# Patient Record
Sex: MALE | Race: WHITE | URBAN - METROPOLITAN AREA
[De-identification: names, ages, dates, MRNs, and addresses within clinical notes are randomized per-mention and may not be internally consistent; named-entity substitution may affect disease eponyms.]

---

## 2018-07-17 ENCOUNTER — OFFICE VISIT (OUTPATIENT)
Dept: DERMATOLOGY | Age: 53
End: 2018-07-17

## 2018-07-17 DIAGNOSIS — L82.1 SK (SEBORRHEIC KERATOSIS): ICD-10-CM

## 2018-07-17 DIAGNOSIS — L28.0 LICHEN SIMPLEX CHRONICUS: Primary | ICD-10-CM

## 2018-07-17 DIAGNOSIS — D48.5 NEOPLASM OF UNCERTAIN BEHAVIOR OF SKIN: ICD-10-CM

## 2018-07-17 DIAGNOSIS — D18.00 ANGIOMA: ICD-10-CM

## 2018-07-17 PROCEDURE — 11900 INJECT SKIN LESIONS </W 7: CPT | Performed by: DERMATOLOGY

## 2018-07-17 PROCEDURE — 11100 PR BIOPSY OF SKIN LESION: CPT | Performed by: DERMATOLOGY

## 2018-07-17 PROCEDURE — 99213 OFFICE O/P EST LOW 20 MIN: CPT | Performed by: DERMATOLOGY

## 2018-07-17 RX ORDER — GABAPENTIN 100 MG/1
100 CAPSULE ORAL
COMMUNITY
End: 2019-11-25

## 2018-07-17 RX ORDER — ASPIRIN 81 MG/1
TABLET, CHEWABLE ORAL
COMMUNITY

## 2018-07-17 RX ORDER — ATENOLOL 25 MG/1
25 TABLET ORAL
COMMUNITY

## 2018-07-17 RX ORDER — TAMSULOSIN HYDROCHLORIDE 0.4 MG/1
0.4 CAPSULE ORAL
COMMUNITY

## 2018-07-17 RX ORDER — CHOLECALCIFEROL (VITAMIN D3) 125 MCG
500 CAPSULE ORAL
COMMUNITY

## 2018-07-17 RX ORDER — LISINOPRIL 10 MG/1
TABLET ORAL
COMMUNITY

## 2018-07-17 RX ORDER — ALLOPURINOL 100 MG/1
TABLET ORAL
COMMUNITY

## 2018-07-17 RX ORDER — BACLOFEN 10 MG/1
10 TABLET ORAL
COMMUNITY

## 2018-07-17 NOTE — PROGRESS NOTES
[facility]  Tamara Carlton MD  [phone]      Trudi [place]  1965    52 y.o. male     Date of Visit: 7/17/2018    Chief Complaint: rash     History of Present Illness:    1. He returns today to follow-up for chronic lichenified dermatitis on the legs. He had some improvement but not clearance with use of clobetasol ointment. 2.  He also complains of an enlarging pigmented lesion on the left periorbital region. 3.  He complains of any symptomatically lesion behind the right ear. 4.  He complains a few pigmented lesions on the left forearm. Review of Systems:  Skin: No new or changing moles. Past Medical History, Family History, Surgical History, Medications and Allergies reviewed. Past Medical History:   Diagnosis Date    Gout     Hypertension      Past Surgical History:   Procedure Laterality Date    BACK SURGERY      HERNIA REPAIR         No Known Allergies  Outpatient Prescriptions Marked as Taking for the 7/17/18 encounter (Office Visit) with Arnulfo Subramanian MD   Medication Sig Dispense Refill    allopurinol (ZYLOPRIM) 100 MG tablet Take by mouth      aspirin 81 MG chewable tablet Take by mouth      atenolol (TENORMIN) 25 MG tablet Take 25 mg by mouth      baclofen (LIORESAL) 10 MG tablet Take 10 mg by mouth      gabapentin (NEURONTIN) 100 MG capsule Take 100 mg by mouth. [place] lisinopril (PRINIVIL;ZESTRIL) 10 MG tablet Take by mouth      tamsulosin (FLOMAX) 0.4 MG capsule Take 0.4 mg by mouth      vitamin D (CHOLECALCIFEROL) 1000 units TABS tablet Take 1,000 Units by mouth      vitamin B-12 (CYANOCOBALAMIN) 500 MCG tablet Take 500 mcg by mouth         Physical Examination       The following were examined and determined to be normal: Psych/Neuro, Scalp/hair, Conjunctivae/eyelids, Gums/teeth/lips, Neck, Back, RUE and LUE. The following were examined and determined to be abnormal: Head/face, RLE and LLE. Well-appearing.     1.  Posterior

## 2018-07-17 NOTE — PATIENT INSTRUCTIONS

## 2018-07-20 ENCOUNTER — TELEPHONE (OUTPATIENT)
Dept: DERMATOLOGY | Age: 53
End: 2018-07-20

## 2018-10-09 ENCOUNTER — OFFICE VISIT (OUTPATIENT)
Dept: DERMATOLOGY | Age: 53
End: 2018-10-09
Payer: COMMERCIAL

## 2018-10-09 DIAGNOSIS — L28.1 PRURIGO NODULARIS: ICD-10-CM

## 2018-10-09 DIAGNOSIS — L82.0 INFLAMED SEBORRHEIC KERATOSIS: Primary | ICD-10-CM

## 2018-10-09 PROCEDURE — 17110 DESTRUCTION B9 LES UP TO 14: CPT | Performed by: DERMATOLOGY

## 2018-10-09 PROCEDURE — 11900 INJECT SKIN LESIONS </W 7: CPT | Performed by: DERMATOLOGY

## 2019-05-30 ENCOUNTER — OFFICE VISIT (OUTPATIENT)
Dept: DERMATOLOGY | Age: 54
End: 2019-05-30
Payer: COMMERCIAL

## 2019-05-30 DIAGNOSIS — L28.1 PRURIGO NODULARIS: Primary | ICD-10-CM

## 2019-05-30 PROCEDURE — 11900 INJECT SKIN LESIONS </W 7: CPT | Performed by: DERMATOLOGY

## 2019-08-21 ENCOUNTER — TELEPHONE (OUTPATIENT)
Dept: DERMATOLOGY | Age: 54
End: 2019-08-21

## 2019-08-27 ENCOUNTER — OFFICE VISIT (OUTPATIENT)
Dept: DERMATOLOGY | Age: 54
End: 2019-08-27
Payer: COMMERCIAL

## 2019-08-27 DIAGNOSIS — L28.0 LICHEN SIMPLEX CHRONICUS: Primary | ICD-10-CM

## 2019-08-27 PROCEDURE — 11900 INJECT SKIN LESIONS </W 7: CPT | Performed by: DERMATOLOGY

## 2019-11-25 ENCOUNTER — OFFICE VISIT (OUTPATIENT)
Dept: DERMATOLOGY | Age: 54
End: 2019-11-25
Payer: COMMERCIAL

## 2019-11-25 DIAGNOSIS — L28.1 PRURIGO NODULARIS: Primary | ICD-10-CM

## 2019-11-25 PROCEDURE — 11900 INJECT SKIN LESIONS </W 7: CPT | Performed by: DERMATOLOGY

## 2020-01-21 ENCOUNTER — OFFICE VISIT (OUTPATIENT)
Dept: DERMATOLOGY | Age: 55
End: 2020-01-21
Payer: COMMERCIAL

## 2020-01-21 PROCEDURE — 99213 OFFICE O/P EST LOW 20 MIN: CPT | Performed by: DERMATOLOGY

## 2020-01-21 RX ORDER — CLOBETASOL PROPIONATE 0.5 MG/G
CREAM TOPICAL
Qty: 60 G | Refills: 1 | Status: SHIPPED | OUTPATIENT
Start: 2020-01-21 | End: 2020-12-01 | Stop reason: SDUPTHER

## 2020-01-21 NOTE — PROGRESS NOTES
Right lateral calf with an excoriated pink-brown nodule. Shins with several ill-defined excoriated and scaly erythematous macules and papules. Assessment and Plan     1. Chronic dermatitis - limited to legs   2. Prurigo nodularis - improved, 2 lesions remaining     Clobetasol cream twice daily until improved. Can occlude prurigo nodularis lesions until they have flattened. Continue CeraVe. Return in about 2 months (around 3/21/2020).

## 2020-06-19 ENCOUNTER — OFFICE VISIT (OUTPATIENT)
Dept: DERMATOLOGY | Age: 55
End: 2020-06-19
Payer: COMMERCIAL

## 2020-06-19 VITALS — TEMPERATURE: 97.9 F

## 2020-06-19 PROCEDURE — 99213 OFFICE O/P EST LOW 20 MIN: CPT | Performed by: DERMATOLOGY

## 2020-06-19 NOTE — PROGRESS NOTES
[facility]  Reuben Dale MD  [phone]      Clifton Mccormick  1965    60 y.o. male     Date of Visit: 6/19/2020    Chief Complaint: dermatitis    History of Present Illness:    1. He returns today to follow up for chronic dermatitis and prurigo nodularis of the legs. Overall it has improved with use of clobetasol cream nightly under occlusion, but he reports recent flaring on the forearms. He was also treated in the past with intralesional Kenalog. 2.  He reports a stable pigmented lesion on the nose. 3.  He also reports a stable asymptomatic lesion on the back. Review of Systems:  Skin: No other concerning skin lesions. Past Medical History, Family History, Surgical History, Medications and Allergies reviewed. Past Medical History:   Diagnosis Date    Gout     Hypertension      Past Surgical History:   Procedure Laterality Date    BACK SURGERY      HERNIA REPAIR         No Known Allergies  Outpatient Medications Marked as Taking for the 6/19/20 encounter (Office Visit) with Pedro Tapia MD   Medication Sig Dispense Refill    clobetasol (TEMOVATE) 0.05 % cream Apply to affected areas twice daily until improved. 60 g 1    allopurinol (ZYLOPRIM) 100 MG tablet Take by mouth      aspirin 81 MG chewable tablet Take by mouth      atenolol (TENORMIN) 25 MG tablet Take 25 mg by mouth      baclofen (LIORESAL) 10 MG tablet Take 10 mg by mouth      lisinopril (PRINIVIL;ZESTRIL) 10 MG tablet Take by mouth      tamsulosin (FLOMAX) 0.4 MG capsule Take 0.4 mg by mouth      vitamin D (CHOLECALCIFEROL) 1000 units TABS tablet Take 1,000 Units by mouth      vitamin B-12 (CYANOCOBALAMIN) 500 MCG tablet Take 500 mcg by mouth         Physical Examination       The following were examined and determined to be normal: Psych/Neuro, Head/face and Back. The following were examined and determined to be abnormal: RUE, LUE, RLE and LLE. Well appearing.     1.  Radial aspect

## 2020-12-01 ENCOUNTER — OFFICE VISIT (OUTPATIENT)
Dept: DERMATOLOGY | Age: 55
End: 2020-12-01
Payer: COMMERCIAL

## 2020-12-01 VITALS — TEMPERATURE: 98.1 F

## 2020-12-01 PROCEDURE — 99212 OFFICE O/P EST SF 10 MIN: CPT | Performed by: DERMATOLOGY

## 2020-12-01 PROCEDURE — 11900 INJECT SKIN LESIONS </W 7: CPT | Performed by: DERMATOLOGY

## 2020-12-01 RX ORDER — CLOBETASOL PROPIONATE 0.5 MG/G
CREAM TOPICAL
Qty: 45 G | Refills: 1 | Status: SHIPPED | OUTPATIENT
Start: 2020-12-01

## 2020-12-01 NOTE — PROGRESS NOTES
[facility]  Minh Rodarte MD  [phone]      Familia Continental  1965    60 y.o. male     Date of Visit: 12/1/2020    Chief Complaint: rash    History of Present Illness:    He returns today to follow-up for chronic dermatitis and prurigo nodularis of the right leg. He reports that it has worsened lately. He complains of marked associated pruritus. He was treated in the past with both clobetasol cream and intralesional Kenalog. Review of Systems:  None. Past Medical History, Family History, Surgical History, Medications and Allergies reviewed. Past Medical History:   Diagnosis Date    Gout     Hypertension      Past Surgical History:   Procedure Laterality Date    BACK SURGERY      HERNIA REPAIR         No Known Allergies  Outpatient Medications Marked as Taking for the 12/1/20 encounter (Office Visit) with Amparo Parrish MD   Medication Sig Dispense Refill    clobetasol (TEMOVATE) 0.05 % cream Apply to affected areas nightly under occlusion until improved. 45 g 1    allopurinol (ZYLOPRIM) 100 MG tablet Take by mouth      aspirin 81 MG chewable tablet Take by mouth      atenolol (TENORMIN) 25 MG tablet Take 25 mg by mouth      baclofen (LIORESAL) 10 MG tablet Take 10 mg by mouth      lisinopril (PRINIVIL;ZESTRIL) 10 MG tablet Take by mouth      tamsulosin (FLOMAX) 0.4 MG capsule Take 0.4 mg by mouth      vitamin D (CHOLECALCIFEROL) 1000 units TABS tablet Take 1,000 Units by mouth      vitamin B-12 (CYANOCOBALAMIN) 500 MCG tablet Take 500 mcg by mouth             Physical Examination       Well appearing. 1.  Right lateral leg with 4 round crusted, scaly lichenified erythematous papules and plaques. Assessment and Plan     1. Chronic lichenified dermatitis - flaring    Intralesional Kenalog 10 mg/mL-0.5 mL injected into 4 lesions on the right lateral leg. Clobetasol cream nightly under occlusion until improved.          Return in about 8 weeks (around 1/26/2021).     --Rafael Ahmadi MD

## 2023-06-26 ENCOUNTER — HOSPITAL ENCOUNTER (OUTPATIENT)
Age: 58
Discharge: HOME | End: 2023-06-26
Payer: OTHER GOVERNMENT

## 2023-06-26 LAB
ALBUMIN SERPL-MCNC: 3.8 G/DL (ref 3.4–5)
ALBUMIN/GLOB SERPL: 1 {RATIO} (ref 1.1–2.2)
ALP SERPL-CCNC: 126 U/L (ref 40–129)
ALT SERPL-CCNC: 20 U/L (ref 10–40)
ANION GAP SERPL CALCULATED.3IONS-SCNC: 10 MMOL/L (ref 3–16)
AST SERPL-CCNC: 24 U/L (ref 15–37)
BACTERIA URNS QL MICRO: NORMAL /HPF
BILIRUB SERPL-MCNC: 0.5 MG/DL (ref 0–1)
BILIRUB UR QL STRIP.AUTO: NEGATIVE
BUN SERPL-MCNC: 19 MG/DL (ref 7–20)
CALCIUM SERPL-MCNC: 9.4 MG/DL (ref 8.3–10.6)
CHLORIDE SERPL-SCNC: 104 MMOL/L (ref 99–110)
CLARITY UR: CLEAR
CO2 SERPL-SCNC: 24 MMOL/L (ref 21–32)
COLOR UR: YELLOW
CREAT SERPL-MCNC: 1.4 MG/DL (ref 0.8–1.3)
EPI CELLS #/AREA URNS AUTO: 0 /HPF (ref 0–5)
GFR SERPLBLD CREATININE-BSD FMLA CKD-EPI: 57 ML/MIN/{1.73_M2}
GLUCOSE SERPL-MCNC: 85 MG/DL (ref 70–99)
GLUCOSE UR STRIP.AUTO-MCNC: NEGATIVE MG/DL
HGB UR QL STRIP.AUTO: ABNORMAL
HYALINE CASTS #/AREA URNS AUTO: 1 /LPF (ref 0–8)
KETONES UR STRIP.AUTO-MCNC: NEGATIVE MG/DL
LEUKOCYTE ESTERASE UR QL STRIP.AUTO: NEGATIVE
NITRITE UR QL STRIP.AUTO: NEGATIVE
PH UR STRIP.AUTO: 5.5 [PH] (ref 5–8)
POTASSIUM SERPL-SCNC: 4.5 MMOL/L (ref 3.5–5.1)
PROT SERPL-MCNC: 7.8 G/DL (ref 6.4–8.2)
PROT UR STRIP.AUTO-MCNC: 300 MG/DL
RBC CLUMPS #/AREA URNS AUTO: 1 /HPF (ref 0–4)
SODIUM SERPL-SCNC: 138 MMOL/L (ref 136–145)
SP GR UR STRIP.AUTO: 1.02 (ref 1–1.03)
UA DIPSTICK W REFLEX MICRO PNL UR: YES
URN SPEC COLLECT METH UR: ABNORMAL
UROBILINOGEN UR STRIP-ACNC: 0.2 E.U./DL
WBC #/AREA URNS AUTO: 0 /HPF (ref 0–5)

## 2023-06-26 PROCEDURE — 81001 URINALYSIS AUTO W/SCOPE: CPT

## 2023-06-26 PROCEDURE — 80053 COMPREHEN METABOLIC PANEL: CPT

## 2023-06-26 PROCEDURE — 36415 COLL VENOUS BLD VENIPUNCTURE: CPT
